# Patient Record
Sex: FEMALE | Race: AMERICAN INDIAN OR ALASKA NATIVE | NOT HISPANIC OR LATINO | Employment: FULL TIME | URBAN - METROPOLITAN AREA
[De-identification: names, ages, dates, MRNs, and addresses within clinical notes are randomized per-mention and may not be internally consistent; named-entity substitution may affect disease eponyms.]

---

## 2024-08-10 ENCOUNTER — APPOINTMENT (OUTPATIENT)
Dept: CT IMAGING | Facility: HOSPITAL | Age: 44
End: 2024-08-10
Payer: COMMERCIAL

## 2024-08-10 ENCOUNTER — HOSPITAL ENCOUNTER (EMERGENCY)
Facility: HOSPITAL | Age: 44
Discharge: LEFT BEFORE DISCHARGE | End: 2024-08-10
Attending: EMERGENCY MEDICINE
Payer: COMMERCIAL

## 2024-08-10 VITALS
HEIGHT: 72 IN | RESPIRATION RATE: 16 BRPM | BODY MASS INDEX: 33.86 KG/M2 | TEMPERATURE: 98.6 F | DIASTOLIC BLOOD PRESSURE: 87 MMHG | OXYGEN SATURATION: 100 % | HEART RATE: 81 BPM | SYSTOLIC BLOOD PRESSURE: 135 MMHG | WEIGHT: 250 LBS

## 2024-08-10 DIAGNOSIS — R93.0 ABNORMAL HEAD CT: Primary | ICD-10-CM

## 2024-08-10 DIAGNOSIS — R42 VERTIGO: ICD-10-CM

## 2024-08-10 DIAGNOSIS — R51.9 ACUTE NONINTRACTABLE HEADACHE, UNSPECIFIED HEADACHE TYPE: ICD-10-CM

## 2024-08-10 PROCEDURE — 25810000003 SODIUM CHLORIDE 0.9 % SOLUTION: Performed by: EMERGENCY MEDICINE

## 2024-08-10 PROCEDURE — 96374 THER/PROPH/DIAG INJ IV PUSH: CPT

## 2024-08-10 PROCEDURE — 96375 TX/PRO/DX INJ NEW DRUG ADDON: CPT

## 2024-08-10 PROCEDURE — 99284 EMERGENCY DEPT VISIT MOD MDM: CPT

## 2024-08-10 PROCEDURE — 25010000002 DIPHENHYDRAMINE PER 50 MG: Performed by: EMERGENCY MEDICINE

## 2024-08-10 PROCEDURE — 25010000002 PROCHLORPERAZINE 10 MG/2ML SOLUTION: Performed by: EMERGENCY MEDICINE

## 2024-08-10 PROCEDURE — 70450 CT HEAD/BRAIN W/O DYE: CPT

## 2024-08-10 RX ORDER — DIPHENHYDRAMINE HYDROCHLORIDE 50 MG/ML
25 INJECTION INTRAMUSCULAR; INTRAVENOUS ONCE
Status: COMPLETED | OUTPATIENT
Start: 2024-08-10 | End: 2024-08-10

## 2024-08-10 RX ORDER — MECLIZINE HYDROCHLORIDE 25 MG/1
25 TABLET ORAL ONCE
Status: COMPLETED | OUTPATIENT
Start: 2024-08-10 | End: 2024-08-10

## 2024-08-10 RX ORDER — SODIUM CHLORIDE 0.9 % (FLUSH) 0.9 %
10 SYRINGE (ML) INJECTION AS NEEDED
Status: DISCONTINUED | OUTPATIENT
Start: 2024-08-10 | End: 2024-08-10 | Stop reason: HOSPADM

## 2024-08-10 RX ORDER — PROCHLORPERAZINE EDISYLATE 5 MG/ML
10 INJECTION INTRAMUSCULAR; INTRAVENOUS ONCE
Status: COMPLETED | OUTPATIENT
Start: 2024-08-10 | End: 2024-08-10

## 2024-08-10 RX ADMIN — SODIUM CHLORIDE 1000 ML: 9 INJECTION, SOLUTION INTRAVENOUS at 14:19

## 2024-08-10 RX ADMIN — DIPHENHYDRAMINE HYDROCHLORIDE 25 MG: 50 INJECTION, SOLUTION INTRAMUSCULAR; INTRAVENOUS at 14:19

## 2024-08-10 RX ADMIN — PROCHLORPERAZINE EDISYLATE 10 MG: 5 INJECTION INTRAMUSCULAR; INTRAVENOUS at 14:19

## 2024-08-10 RX ADMIN — MECLIZINE HYDROCHLORIDE 25 MG: 25 TABLET ORAL at 14:19

## 2024-08-10 NOTE — ED PROVIDER NOTES
EMERGENCY DEPARTMENT ENCOUNTER  Room Number:  23/23  PCP: Provider, No Known  Independent Historians: Patient      HPI:  Chief Complaint: had concerns including Dizziness, Nausea, and Headache.     A complete HPI/ROS/PMH/PSH/SH/FH are unobtainable due to: None    Chronic or social conditions impacting patient care (Social Determinants of Health): None      Context: Keily Denis is a 44 y.o. female with a medical history of headaches, diabetes, sinus disease who presents to the ED c/o acute headache.  The patient reports around 11:00 in the morning today she developed an acute headache in the center of her forehead to go straight back to the back of her head.  She reports she was vomiting and she developed room spinning dizziness.  She reports she has some floaters in her vision which she has had before with similar headaches.  She reports that she had sinus surgery years ago and since then has had fewer headaches.  She does not have a neurologist.  She denies any weakness or numbness.  She denies any speech changes.  She reports a recent right hand injury and is in the right wrist splint.      Review of prior external notes (non-ED) -and- Review of prior external test results outside of this encounter:  Right hand x-ray 7/24/2024 shows no acute fracture or dislocation    Prescription drug monitoring program review:         PAST MEDICAL HISTORY  Active Ambulatory Problems     Diagnosis Date Noted    No Active Ambulatory Problems     Resolved Ambulatory Problems     Diagnosis Date Noted    No Resolved Ambulatory Problems     Past Medical History:   Diagnosis Date    Diabetes mellitus          PAST SURGICAL HISTORY  Past Surgical History:   Procedure Laterality Date    APPENDECTOMY      CHOLECYSTECTOMY      GASTRIC BYPASS           FAMILY HISTORY  History reviewed. No pertinent family history.      SOCIAL HISTORY  Social History     Socioeconomic History    Marital status: Single   Tobacco Use    Smoking status: Never     Smokeless tobacco: Never   Substance and Sexual Activity    Alcohol use: Not Currently    Sexual activity: Defer         ALLERGIES  Nsaids      REVIEW OF SYSTEMS  Review of Systems  Included in HPI  All systems reviewed and negative except for those discussed in HPI.      PHYSICAL EXAM    I have reviewed the triage vital signs and nursing notes.    ED Triage Vitals   Temp Heart Rate Resp BP SpO2   08/10/24 1340 08/10/24 1340 08/10/24 1340 08/10/24 1344 08/10/24 1340   98.6 °F (37 °C) 84 18 149/88 100 %      Temp src Heart Rate Source Patient Position BP Location FiO2 (%)   08/10/24 1340 08/10/24 1340 -- 08/10/24 1344 --   Tympanic Monitor  Right arm        Physical Exam  GENERAL: Awake, alert, no acute distress  SKIN: Warm, dry  HENT: Normocephalic, atraumatic, horizontal nystagmus on lateral gaze  EYES: no scleral icterus  CV: regular rhythm, regular rate  RESPIRATORY: normal effort, lungs clear  ABDOMEN: soft, nontender, nondistended  MUSCULOSKELETAL: no deformity.  Right hand in a splint.  NEURO: alert, moves all extremities, follows commands, cranial nerves II through XII intact.  Equal upper and lower extremity strength and sensation.              LAB RESULTS  No results found for this or any previous visit (from the past 24 hour(s)).      RADIOLOGY  CT Head Without Contrast    Result Date: 8/10/2024  EMERGENCY CT SCAN OF THE HEAD WITHOUT CONTRAST ON 08/10/2024  CLINICAL HISTORY: This is a 44-year-old female patient who has an acute headache and reports that the room is spinning and has dizziness.  TECHNIQUE: Spiral CT images were obtained from the base of the skull to the vertex without intravenous contrast. The images were reformatted and are submitted in 3 mm thick axial, sagittal and coronal CT sections with brain algorithm.  There are no prior head CTs or MRIs of the brain from [facility] for comparison.  FINDINGS: There is a 12 x 7 mm focal triangular area of low density over the  posterior lateral left cerebellum compatible with a small left PICA territory infarct, age-indeterminate. The remainder of the brain parenchyma is normal in attenuation. The ventricles are normal in size. I see no focal mass effect. There is no midline shift. No extra-axial fluid collections are identified. There is no evidence of acute intracranial hemorrhage. The calvarium and the skull base are normal in appearance. The paranasal sinuses and the mastoid air cells and middle ear cavities are clear. The orbits are normal in appearance.      There is an age-indeterminate 12 x 7 mm posterior lateral left cerebellar infarct in the left PICA territory. The remainder of the head CT is within normal limits. If one wishes to date the left PICA territory infarct or if there is any clinical suspicion of an acute stroke causing this patient's dizziness, an MRI of the brain is recommended for a more comprehensive assessment. The results and recommendations were communicated to Elijah Brown in the ER by telephone on 08/10/2024 at 3:15 p.m.  Radiation dose reduction techniques were utilized, including automated exposure control and exposure modulation based on body size.   This report was finalized on 8/10/2024 3:23 PM by Dr. Guy Sanchez M.D on Workstation: [id number]         MEDICATIONS GIVEN IN ER  Medications   sodium chloride 0.9 % bolus 1,000 mL (0 mL Intravenous Stopped 8/10/24 1605)   diphenhydrAMINE (BENADRYL) injection 25 mg (25 mg Intravenous Given 8/10/24 1419)   prochlorperazine (COMPAZINE) injection 10 mg (10 mg Intravenous Given 8/10/24 1419)   meclizine (ANTIVERT) tablet 25 mg (25 mg Oral Given 8/10/24 1419)         ORDERS PLACED DURING THIS VISIT:  Orders Placed This Encounter   Procedures    CT Head Without Contrast    Ambulatory Referral to Family Practice    Ambulatory Referral to Neurology         OUTPATIENT MEDICATION MANAGEMENT:  No current Epic-ordered facility-administered medications on file.      Current Outpatient Medications Ordered in Epic   Medication Sig Dispense Refill    Continuous Glucose Sensor (Dexcom G7 Sensor) misc For use with continuous glucose monitoring. Change every 10 days 3 each 1    fluconazole (DIFLUCAN) 150 MG tablet Take 1 tablet by mouth 1 time a day. May take the 2nd pill by mouth 2 days later if symptoms are still present. 2 tablet 0    fluconazole (DIFLUCAN) 150 MG tablet Take 1 tablet by mouth 1 time a day. May take the 2nd pill by mouth 2 days later if symptoms are still present. 2 tablet 0    Gvoke HypoPen 1-Pack 1 MG/0.2ML solution auto-injector Inject 1 pen (1 mg total) subcutaneously once as needed. 0.2 mL 1    Insulin Aspart (novoLOG) 100 UNIT/ML injection Inject 66 Units under the skin into the appropriate area as directed Daily. 20 mL 0    metroNIDAZOLE (FLAGYL) 500 MG tablet Take 1 tablet by mouth 2 times a day. Do not consume alcohol while taking this product. 14 tablet 0    metroNIDAZOLE (FLAGYL) 500 MG tablet Take 1 tablet by mouth 2 times a day. Do not consume alcohol while taking this product. 14 tablet 0    ondansetron ODT (ZOFRAN-ODT) 8 MG disintegrating tablet Dissolve one tablet by mouth every 8 hours or as needed for vomiting. 2 tablet 0    ondansetron ODT (ZOFRAN-ODT) 8 MG disintegrating tablet Dissolve one tablet by mouth every 8 hours or as needed for vomiting. 2 tablet 0         PROCEDURES  Procedures            PROGRESS, DATA ANALYSIS, CONSULTS, AND MEDICAL DECISION MAKING  All labs have been independently interpreted by me.  All radiology studies have been reviewed by me. All EKG's have been independently viewed and interpreted by me.  Discussion below represents my analysis of pertinent findings related to patient's condition, differential diagnosis, treatment plan and final disposition.    Differential diagnosis includes but is not limited to migraine headache, intracranial hemorrhage, vertigo, sinusitis,.    Clinical Scores:                   ED  Course as of 08/10/24 1828   Sat Aug 10, 2024   1356 Clinically the patient has a sinus headache which cause increased auricular pressure with vomiting and therefore caused vertigo. [TR]   1514 CT Head Without Contrast  My independent interpretation of the imaging study is no acute hemorrhage [TR]   1517 Discussing with Dr. Sanchez with neuroradiology.  CT of the head shows a left cerebellum age-indeterminate infarct [TR]   1537 I reviewed the workup and findings with the patient at the bedside.  She states she is feeling somewhat better after the Compazine, Benadryl, meclizine.  I notified her of her stroke seen on CT imaging.  I discussed with her my intention to admit her to the hospital for stroke workup.  I cannot entirely rule out a new stroke today given her room spinning dizziness.  She adamantly refuses admission to the hospital.  She states she has a dog with spina bifida that wears diapers and she has no one that can take care of him.  She states that she has no family or friends in town that can take care of the dog.  She states she will not be staying in the hospital.  I offered for her to return at any time to let us admit her for further stroke workup.  She is agreeable. [TR]      ED Course User Index  [TR] Renzo Brown MD             AS OF 18:28 EDT VITALS:    BP - 135/87  HR - 81  TEMP - 98.6 °F (37 °C) (Tympanic)  O2 SATS - 100%    COMPLEXITY OF CARE        DIAGNOSIS  Final diagnoses:   Abnormal head CT   Acute nonintractable headache, unspecified headache type   Vertigo         DISPOSITION  ED Disposition       ED Disposition   AMA    Condition   --    Comment   --                Please note that portions of this document were completed with a voice recognition program.    Note Disclaimer: At [facility], we believe that sharing information builds trust and better relationships. You are receiving this note because you recently visited [facility]. It is possible you will see [facility]  information before a provider has talked with you about it. This kind of information can be easy to misunderstand. To help you fully understand what it means for your health, we urge you to discuss this note with your provider.         Renzo Brown MD  08/10/24 0612

## 2024-08-10 NOTE — ED TRIAGE NOTES
Pt Ed from work (pt works here) due to dizzy, HA and nausea that started around 11:00 today. Pt stated dizziness is worse with with movement Pt stated hx of migraines and this episode is similar. Pt stated floaters over her eyes.

## 2024-08-10 NOTE — DISCHARGE INSTRUCTIONS
As soon as you are able, return immediately for further testing and admission.  We are always happy to take care of you.  You are at high risk of further strokes as we do not know what caused the stroke that was seen on CT today.  Follow-up with your primary care doctor for further evaluation.  I have placed referrals for primary care as well as neurology but this is not a suitable alternative to returning to the hospital for admission and further testing.

## 2024-08-14 ENCOUNTER — OFFICE VISIT (OUTPATIENT)
Dept: NEUROLOGY | Facility: CLINIC | Age: 44
End: 2024-08-14
Payer: COMMERCIAL

## 2024-08-14 VITALS
SYSTOLIC BLOOD PRESSURE: 112 MMHG | HEART RATE: 90 BPM | WEIGHT: 289 LBS | DIASTOLIC BLOOD PRESSURE: 76 MMHG | BODY MASS INDEX: 39.2 KG/M2 | OXYGEN SATURATION: 99 %

## 2024-08-14 DIAGNOSIS — G43.E09 CHRONIC MIGRAINE WITH AURA WITHOUT STATUS MIGRAINOSUS, NOT INTRACTABLE: ICD-10-CM

## 2024-08-14 DIAGNOSIS — R90.89 ABNORMAL CT OF BRAIN: Primary | ICD-10-CM

## 2024-08-14 DIAGNOSIS — R42 VERTIGO: ICD-10-CM
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
## 2024-08-15 ENCOUNTER — PATIENT ROUNDING (BHMG ONLY) (OUTPATIENT)
Dept: NEUROLOGY | Facility: CLINIC | Age: 44
End: 2024-08-15
Payer: COMMERCIAL

## 2024-08-19 ENCOUNTER — OFFICE VISIT (OUTPATIENT)
Dept: FAMILY MEDICINE CLINIC | Facility: CLINIC | Age: 44
End: 2024-08-19
Payer: COMMERCIAL

## 2024-08-19 VITALS
OXYGEN SATURATION: 98 % | BODY MASS INDEX: 37.63 KG/M2 | DIASTOLIC BLOOD PRESSURE: 78 MMHG | SYSTOLIC BLOOD PRESSURE: 132 MMHG | WEIGHT: 277.8 LBS | HEART RATE: 92 BPM | HEIGHT: 72 IN

## 2024-08-19 DIAGNOSIS — Z13.228 ENCOUNTER FOR SCREENING FOR OTHER METABOLIC DISORDERS: ICD-10-CM

## 2024-08-19 DIAGNOSIS — Z13.220 SCREENING FOR HYPERLIPIDEMIA: ICD-10-CM

## 2024-08-19 DIAGNOSIS — E55.9 VITAMIN D DEFICIENCY: ICD-10-CM

## 2024-08-19 DIAGNOSIS — Z12.31 ENCOUNTER FOR SCREENING MAMMOGRAM FOR MALIGNANT NEOPLASM OF BREAST: ICD-10-CM

## 2024-08-19 DIAGNOSIS — E03.9 ACQUIRED HYPOTHYROIDISM: ICD-10-CM

## 2024-08-19 DIAGNOSIS — E10.9 TYPE 1 DIABETES MELLITUS WITHOUT COMPLICATION: Primary | ICD-10-CM

## 2024-08-19 DIAGNOSIS — Z11.59 NEED FOR HEPATITIS C SCREENING TEST: ICD-10-CM

## 2024-08-19 DIAGNOSIS — G43.919 INTRACTABLE MIGRAINE WITHOUT STATUS MIGRAINOSUS, UNSPECIFIED MIGRAINE TYPE: ICD-10-CM

## 2024-08-19 DIAGNOSIS — Z00.00 ANNUAL PHYSICAL EXAM: ICD-10-CM

## 2024-08-19 PROCEDURE — 99386 PREV VISIT NEW AGE 40-64: CPT | Performed by: NURSE PRACTITIONER

## 2024-08-19 PROCEDURE — 99213 OFFICE O/P EST LOW 20 MIN: CPT | Performed by: NURSE PRACTITIONER

## 2024-08-19 RX ORDER — INSULIN ASPART 100 [IU]/ML
66 INJECTION, SOLUTION INTRAVENOUS; SUBCUTANEOUS DAILY
Qty: 20 ML | Refills: 0 | Status: SHIPPED | OUTPATIENT
Start: 2024-08-19

## 2024-08-19 RX ORDER — ACYCLOVIR 400 MG/1
TABLET ORAL
Qty: 3 EACH | Refills: 1 | Status: SHIPPED | OUTPATIENT
Start: 2024-08-19

## 2024-08-19 RX ORDER — GLUCAGON INJECTION, SOLUTION 1 MG/.2ML
INJECTION, SOLUTION SUBCUTANEOUS
Qty: 0.2 ML | Refills: 1 | Status: SHIPPED | OUTPATIENT
Start: 2024-08-19

## 2024-08-19 RX ORDER — LEVOTHYROXINE SODIUM 0.2 MG/1
175 TABLET ORAL DAILY
COMMUNITY
End: 2024-08-20 | Stop reason: DRUGHIGH

## 2024-08-19 NOTE — PROGRESS NOTES
Preventive Exam    History of Present Illness: Keily Denis is a 44 y.o. here for check up and review of routine health maintenance. she states she is doing well and has no concerns.    Patient is new to me and new to our office. She is type 1 diabetes.since she was a teen, she has insulin pump and was being managed by an endocrinologist in [place].     PMHX: hypothyroidism, migraines, type 1 diabetes, ADHD, anxiety.     Recent ER visit for dizziness and migraine. She had abnormal CT. Is being followed by neurology.     Past medical history, surgical history and family history have been reviewed.       Review of Systems   Constitutional:  Negative for appetite change, chills, fatigue, fever, unexpected weight gain and unexpected weight loss.   HENT:  Positive for congestion and rhinorrhea. Negative for dental problem, hearing loss, sinus pressure and sore throat.         Dental exam is due.   Eyes: Negative.  Negative for blurred vision, double vision, photophobia and visual disturbance.        Wears glasses. Eye exam is up to date.    Respiratory:  Negative for cough, chest tightness, shortness of breath and wheezing.    Cardiovascular:  Positive for palpitations. Negative for chest pain and leg swelling.   Gastrointestinal:  Positive for diarrhea (due to bariatric surgery). Negative for abdominal pain, constipation, nausea, vomiting and GERD.   Endocrine: Negative.  Negative for cold intolerance, heat intolerance, polydipsia, polyphagia and polyuria.   Genitourinary:  Positive for menstrual problem (heavy). Negative for breast discharge, breast lump, breast pain, flank pain, frequency, hematuria, pelvic pain, pelvic pressure, vaginal bleeding and vaginal discharge.   Musculoskeletal:  Negative for arthralgias, back pain, joint swelling and myalgias.   Skin: Negative.  Negative for color change and pallor.   Allergic/Immunologic: Positive for environmental allergies (seasonal allergies). Negative for food allergies.    Neurological:  Positive for headache. Negative for dizziness, weakness and numbness.   Hematological: Negative.  Does not bruise/bleed easily.   Psychiatric/Behavioral:  Negative for behavioral problems, sleep disturbance, suicidal ideas and depressed mood. The patient is nervous/anxious.        PHYSICAL EXAM    Vitals:    08/19/24 1325   BP: 132/78   Pulse: 92   SpO2: 98%       Body mass index is 37.68 kg/m².         Physical Exam  Vitals and nursing note reviewed.   Constitutional:       Appearance: Normal appearance. She is well-developed. She is obese.   HENT:      Head: Normocephalic and atraumatic.      Right Ear: Tympanic membrane, ear canal and external ear normal.      Left Ear: Tympanic membrane, ear canal and external ear normal.      Nose: Nose normal.      Mouth/Throat:      Lips: Pink.      Mouth: Mucous membranes are moist.      Tongue: No lesions.      Palate: No mass and lesions.      Pharynx: Oropharynx is clear. Uvula midline.      Tonsils: No tonsillar exudate.   Eyes:      Conjunctiva/sclera: Conjunctivae normal.      Pupils: Pupils are equal, round, and reactive to light.   Neck:      Thyroid: No thyromegaly.   Cardiovascular:      Rate and Rhythm: Normal rate and regular rhythm.      Pulses: Normal pulses.           Dorsalis pedis pulses are 2+ on the right side and 2+ on the left side.        Posterior tibial pulses are 2+ on the right side and 2+ on the left side.      Heart sounds: Normal heart sounds. No murmur heard.  Pulmonary:      Effort: Pulmonary effort is normal.      Breath sounds: Normal breath sounds.   Abdominal:      General: Bowel sounds are normal. There is no distension.      Palpations: Abdomen is soft.      Tenderness: There is no abdominal tenderness.   Musculoskeletal:         General: No deformity. Normal range of motion.      Cervical back: Normal range of motion and neck supple.      Right lower leg: No edema.      Left lower leg: No edema.   Lymphadenopathy:       Head:      Right side of head: No submental, submandibular, tonsillar, preauricular, posterior auricular or occipital adenopathy.      Left side of head: No submental, submandibular, tonsillar, preauricular, posterior auricular or occipital adenopathy.      Cervical: No cervical adenopathy.      Right cervical: No superficial, deep or posterior cervical adenopathy.     Left cervical: No superficial, deep or posterior cervical adenopathy.      Upper Body:      Right upper body: No supraclavicular adenopathy.      Left upper body: No supraclavicular adenopathy.   Skin:     General: Skin is warm and dry.      Capillary Refill: Capillary refill takes 2 to 3 seconds.   Neurological:      General: No focal deficit present.      Mental Status: She is alert and oriented to person, place, and time.      Cranial Nerves: No cranial nerve deficit.      Sensory: Sensation is intact.      Motor: Motor function is intact.      Coordination: Coordination is intact.      Gait: Gait is intact.   Psychiatric:         Attention and Perception: Attention and perception normal.         Mood and Affect: Mood and affect normal.         Speech: Speech normal.         Behavior: Behavior normal. Behavior is cooperative.         Thought Content: Thought content normal.         Cognition and Memory: Cognition and memory normal.         Judgment: Judgment normal.         Procedures    Diagnoses and all orders for this visit:    1. Type 1 diabetes mellitus without complication (Primary)  -     Ambulatory Referral to Endocrinology  -     Comprehensive Metabolic Panel  -     Microalbumin / Creatinine Urine Ratio - Urine, Clean Catch  -     Hemoglobin A1c    2. Annual physical exam    3. Acquired hypothyroidism  -     Ambulatory Referral to Endocrinology  -     TSH  -     T4, Free    4. Encounter for screening for other metabolic disorders  -     CBC & Differential    5. Screening for hyperlipidemia  -     Lipid Panel With / Chol / HDL Ratio    6.  Need for hepatitis C screening test  -     Hepatitis C Antibody    7. Intractable migraine without status migrainosus, unspecified migraine type    8. Encounter for screening mammogram for malignant neoplasm of breast  -     Mammo Screening Digital Tomosynthesis Bilateral With CAD; Future    9. Vitamin D deficiency  -     Vitamin D,25-Hydroxy    Other orders  -     Continuous Glucose Sensor (Dexcom G7 Sensor) misc; For use with continuous glucose monitoring. Change every 10 days  Dispense: 3 each; Refill: 1  -     Gvoke HypoPen 1-Pack 1 MG/0.2ML solution auto-injector; Inject 1 pen (1 mg total) subcutaneously once as needed.  Dispense: 0.2 mL; Refill: 1  -     Insulin Aspart (novoLOG) 100 UNIT/ML injection; Inject 66 Units under the skin into the appropriate area as directed Daily.  Dispense: 20 mL; Refill: 0  -     ubrogepant (Ubrelvy) 100 MG tablet; Take 1 tablet by mouth 1 (One) Time As Needed (migraine). Patient has been off for 3 months-cannot remember dosage  Dispense: 16 tablet; Refill: 3        Problems Addressed this Visit    None  Visit Diagnoses       Type 1 diabetes mellitus without complication    -  Primary    Relevant Medications    Gvoke HypoPen 1-Pack 1 MG/0.2ML solution auto-injector    Insulin Aspart (novoLOG) 100 UNIT/ML injection    Other Relevant Orders    Ambulatory Referral to Endocrinology    Comprehensive Metabolic Panel    Microalbumin / Creatinine Urine Ratio - Urine, Clean Catch    Hemoglobin A1c    Annual physical exam        Acquired hypothyroidism        Relevant Medications    levothyroxine (SYNTHROID, LEVOTHROID) 200 MCG tablet    Other Relevant Orders    Ambulatory Referral to Endocrinology    TSH    T4, Free    Encounter for screening for other metabolic disorders        Relevant Orders    CBC & Differential    Screening for hyperlipidemia        Relevant Orders    Lipid Panel With / Chol / HDL Ratio    Need for hepatitis C screening test        Relevant Orders    Hepatitis C  Antibody    Intractable migraine without status migrainosus, unspecified migraine type        Relevant Medications    ubrogepant (Ubrelvy) 100 MG tablet    Encounter for screening mammogram for malignant neoplasm of breast        Relevant Orders    Mammo Screening Digital Tomosynthesis Bilateral With CAD    Vitamin D deficiency        Relevant Orders    Vitamin D,25-Hydroxy          Diagnoses         Codes Comments    Type 1 diabetes mellitus without complication    -  Primary ICD-10-CM: E10.9  ICD-9-CM: 250.01     Annual physical exam     ICD-10-CM: Z00.00  ICD-9-CM: V70.0     Acquired hypothyroidism     ICD-10-CM: E03.9  ICD-9-CM: 244.9     Encounter for screening for other metabolic disorders     ICD-10-CM: Z13.228  ICD-9-CM: V77.99     Screening for hyperlipidemia     ICD-10-CM: Z13.220  ICD-9-CM: V77.91     Need for hepatitis C screening test     ICD-10-CM: Z11.59  ICD-9-CM: V73.89     Intractable migraine without status migrainosus, unspecified migraine type     ICD-10-CM: G43.919  ICD-9-CM: 346.91     Encounter for screening mammogram for malignant neoplasm of breast     ICD-10-CM: Z12.31  ICD-9-CM: V76.12     Vitamin D deficiency     ICD-10-CM: E55.9  ICD-9-CM: 268.9           Refill of insulin Novolog  Refill of auto injector  Refill of Dexcom G7  Refill of Ubrelvy  Will fill Levothyroxine once TSH level is received.  CBC  Cmp  A1C  Lipid panel  Microalbumin  Hepatitis C screening  TSH  T4  Referral to endocrinology  Mammogram  Routine health maintenance reviewed and discussed with Keily Denis.    Preventative counseling regarding healthy diet and exercise.   Pt reports that he wears a seatbelt regularly.     Return in about 1 year (around 8/19/2025) for Annual, Labs.

## 2024-08-20 DIAGNOSIS — E03.9 ACQUIRED HYPOTHYROIDISM: ICD-10-CM

## 2024-08-20 DIAGNOSIS — E55.9 VITAMIN D DEFICIENCY: Primary | ICD-10-CM

## 2024-08-20 LAB
25(OH)D3+25(OH)D2 SERPL-MCNC: 25.4 NG/ML (ref 30–100)
ALBUMIN SERPL-MCNC: 3.9 G/DL (ref 3.9–4.9)
ALBUMIN/CREAT UR: 22 MG/G CREAT (ref 0–29)
ALP SERPL-CCNC: 99 IU/L (ref 44–121)
ALT SERPL-CCNC: 13 IU/L (ref 0–32)
AST SERPL-CCNC: 19 IU/L (ref 0–40)
BASOPHILS # BLD AUTO: 0.1 X10E3/UL (ref 0–0.2)
BASOPHILS NFR BLD AUTO: 1 %
BILIRUB SERPL-MCNC: 0.3 MG/DL (ref 0–1.2)
BUN SERPL-MCNC: 15 MG/DL (ref 6–24)
BUN/CREAT SERPL: 16 (ref 9–23)
CALCIUM SERPL-MCNC: 8.5 MG/DL (ref 8.7–10.2)
CHLORIDE SERPL-SCNC: 105 MMOL/L (ref 96–106)
CHOLEST SERPL-MCNC: 195 MG/DL (ref 100–199)
CHOLEST/HDLC SERPL: 3.1 RATIO (ref 0–4.4)
CO2 SERPL-SCNC: 21 MMOL/L (ref 20–29)
CREAT SERPL-MCNC: 0.91 MG/DL (ref 0.57–1)
CREAT UR-MCNC: 181.4 MG/DL
EGFRCR SERPLBLD CKD-EPI 2021: 80 ML/MIN/1.73
EOSINOPHIL # BLD AUTO: 0.2 X10E3/UL (ref 0–0.4)
EOSINOPHIL NFR BLD AUTO: 3 %
ERYTHROCYTE [DISTWIDTH] IN BLOOD BY AUTOMATED COUNT: 13.9 % (ref 11.7–15.4)
GLOBULIN SER CALC-MCNC: 2.3 G/DL (ref 1.5–4.5)
GLUCOSE SERPL-MCNC: 205 MG/DL (ref 70–99)
HBA1C MFR BLD: 7.3 % (ref 4.8–5.6)
HCT VFR BLD AUTO: 34.8 % (ref 34–46.6)
HCV IGG SERPL QL IA: NON REACTIVE
HDLC SERPL-MCNC: 63 MG/DL
HGB BLD-MCNC: 10.8 G/DL (ref 11.1–15.9)
IMM GRANULOCYTES # BLD AUTO: 0 X10E3/UL (ref 0–0.1)
IMM GRANULOCYTES NFR BLD AUTO: 0 %
LDLC SERPL CALC-MCNC: 110 MG/DL (ref 0–99)
LYMPHOCYTES # BLD AUTO: 2.1 X10E3/UL (ref 0.7–3.1)
LYMPHOCYTES NFR BLD AUTO: 30 %
MCH RBC QN AUTO: 25.4 PG (ref 26.6–33)
MCHC RBC AUTO-ENTMCNC: 31 G/DL (ref 31.5–35.7)
MCV RBC AUTO: 82 FL (ref 79–97)
MICROALBUMIN UR-MCNC: 40.8 UG/ML
MONOCYTES # BLD AUTO: 0.6 X10E3/UL (ref 0.1–0.9)
MONOCYTES NFR BLD AUTO: 8 %
NEUTROPHILS # BLD AUTO: 4.2 X10E3/UL (ref 1.4–7)
NEUTROPHILS NFR BLD AUTO: 58 %
PLATELET # BLD AUTO: 271 X10E3/UL (ref 150–450)
POTASSIUM SERPL-SCNC: 4 MMOL/L (ref 3.5–5.2)
PROT SERPL-MCNC: 6.2 G/DL (ref 6–8.5)
RBC # BLD AUTO: 4.26 X10E6/UL (ref 3.77–5.28)
SODIUM SERPL-SCNC: 138 MMOL/L (ref 134–144)
T4 FREE SERPL-MCNC: 0.69 NG/DL (ref 0.82–1.77)
TRIGL SERPL-MCNC: 125 MG/DL (ref 0–149)
TSH SERPL DL<=0.005 MIU/L-ACNC: 6.61 UIU/ML (ref 0.45–4.5)
VLDLC SERPL CALC-MCNC: 22 MG/DL (ref 5–40)
WBC # BLD AUTO: 7.2 X10E3/UL (ref 3.4–10.8)

## 2024-08-20 RX ORDER — LEVOTHYROXINE SODIUM 0.2 MG/1
200 TABLET ORAL DAILY
Qty: 30 TABLET | Refills: 3 | Status: SHIPPED | OUTPATIENT
Start: 2024-08-20

## 2024-08-20 RX ORDER — ERGOCALCIFEROL 1.25 MG/1
50000 CAPSULE ORAL WEEKLY
Qty: 5 CAPSULE | Refills: 2 | Status: SHIPPED | OUTPATIENT
Start: 2024-08-20

## 2024-08-23 ENCOUNTER — TELEPHONE (OUTPATIENT)
Dept: FAMILY MEDICINE CLINIC | Facility: CLINIC | Age: 44
End: 2024-08-23
Payer: COMMERCIAL

## 2024-08-23 ENCOUNTER — PATIENT ROUNDING (BHMG ONLY) (OUTPATIENT)
Dept: FAMILY MEDICINE CLINIC | Facility: CLINIC | Age: 44
End: 2024-08-23
Payer: COMMERCIAL

## 2024-08-23 NOTE — TELEPHONE ENCOUNTER
Called patient's pharmacy for insurance info to do PA for Ubrelvy 100mg. Lvm 2x. Also sent patient mychart message

## 2024-08-23 NOTE — PROGRESS NOTES
A [employer] message has been sent to the patient for PATIENT ROUNDING with [facility].

## 2024-08-29 ENCOUNTER — SPECIALTY PHARMACY (OUTPATIENT)
Dept: ENDOCRINOLOGY | Age: 44
End: 2024-08-29
Payer: COMMERCIAL

## 2024-08-29 ENCOUNTER — OFFICE VISIT (OUTPATIENT)
Dept: ENDOCRINOLOGY | Age: 44
End: 2024-08-29
Payer: COMMERCIAL

## 2024-08-29 VITALS
BODY MASS INDEX: 37 KG/M2 | HEART RATE: 91 BPM | OXYGEN SATURATION: 99 % | DIASTOLIC BLOOD PRESSURE: 80 MMHG | SYSTOLIC BLOOD PRESSURE: 120 MMHG | WEIGHT: 272.8 LBS | TEMPERATURE: 96.6 F

## 2024-08-29 DIAGNOSIS — E10.9 TYPE 1 DIABETES MELLITUS WITHOUT COMPLICATION: Primary | ICD-10-CM

## 2024-08-29 DIAGNOSIS — E03.9 ACQUIRED HYPOTHYROIDISM: ICD-10-CM

## 2024-08-29 RX ORDER — INSULIN ASPART 100 [IU]/ML
70 INJECTION, SOLUTION INTRAVENOUS; SUBCUTANEOUS DAILY
Qty: 20 ML | Refills: 2 | Status: SHIPPED | OUTPATIENT
Start: 2024-08-29

## 2024-08-29 RX ORDER — ACYCLOVIR 400 MG/1
TABLET ORAL
Qty: 3 EACH | Refills: 2 | Status: SHIPPED | OUTPATIENT
Start: 2024-08-29

## 2024-08-29 RX ORDER — SIMVASTATIN 10 MG
10 TABLET ORAL EVERY EVENING
Qty: 30 TABLET | Refills: 11 | Status: SHIPPED | OUTPATIENT
Start: 2024-08-29 | End: 2025-08-29

## 2024-08-29 NOTE — PROGRESS NOTES
Benefits Investigation Summary    Prescription: Renewal     Dispensing pharmacy: [facility]    Copay amount: Novolog $10/20 mL    Prior Auth and Med Assistance notes: N/A    BIN: [id number]   PCN: [id number]  RX GROUP: [id number]    Patient reported being almost out of pump supplies. Patient was provided [employer] contact number ([phone] option 3) and [employer] Evangelista duvall will be reaching out to patient.     Lorie Reyes, [place], MM   Clinical Speciality Pharmacist, Endocrinology   8/29/2024  16:11 EDT

## 2024-08-29 NOTE — PROGRESS NOTES
Chief complaint   Chief Complaint   Patient presents with    Diabetes    Hypothyroidism          Subjective     History of Present Illness:      This is a 44 year old female I am seeing for type 1 DM   referred by PCP     other medical history is   1- Hypothyroidism   2- Obesity with h/o bariatric surgery   3- Low Vit D   Pt had T1DM for many years   Current home medication for diabetes   T slim  X 2pump     the A1c was  7.3 in 8-2024  Checks blood sugar at home using CGM DEXCOM G 7   Checks blood sugar 4 times per day   SBMG:  pt states that she is here to establish care for her diabetes and thyroid and moved from [place] and saw a new PC in 8-2024 and TSH was high and dose was adjusted from 175 mcg 6/7 days to 200 mcg daily and is about to run out of pump supply , there is No Hypoglycemic episodes  No AM Headaches /Nightmares  No Polyuria / Polydipsia  No Blurring of Vision  Last Dilated Pupil Exam, in 2023 , no DM in the eye   NoTingling / numbness in feet  No Dizziness / lightheadedness  Dietary Compliance, tries   Exercise , walking at work   Weight is going down and up   No Falls, No Nausea, vomiting / Diarrhea  Works for [employer] now    Latest Reference Range & Units 08/19/24 14:26   Sodium 134 - 144 mmol/L 138   Potassium 3.5 - 5.2 mmol/L 4.0   Chloride 96 - 106 mmol/L 105   CO2 20 - 29 mmol/L 21   BUN 6 - 24 mg/dL 15   Creatinine 0.57 - 1.00 mg/dL 0.91   BUN/Creatinine Ratio 9 - 23  16   EGFR Result >59 mL/min/1.73 80   Glucose 70 - 99 mg/dL 205 (H)   Calcium 8.7 - 10.2 mg/dL 8.5 (L)   Alkaline Phosphatase 44 - 121 IU/L 99   Total Protein 6.0 - 8.5 g/dL 6.2   Albumin 3.9 - 4.9 g/dL 3.9   AST (SGOT) 0 - 40 IU/L 19   ALT (SGPT) 0 - 32 IU/L 13   Total Bilirubin 0.0 - 1.2 mg/dL 0.3   Hemoglobin A1C 4.8 - 5.6 % 7.3 (H)   TSH Baseline 0.450 - 4.500 uIU/mL 6.610 (H)   Free T4 0.82 - 1.77 ng/dL 0.69 (L)   Total Cholesterol 100 - 199 mg/dL 195   HDL Cholesterol >39 mg/dL 63   LDL Cholesterol  0 - 99 mg/dL 110 (H)    Triglycerides 0 - 149 mg/dL 125   Chol/HDL Ratio 0.0 - 4.4 ratio 3.1   VLDL Cholesterol Raymond 5 - 40 mg/dL 22   25 Hydroxy, Vitamin D 30.0 - 100.0 ng/mL 25.4 (L)   Globulin 1.5 - 4.5 g/dL 2.3   WBC 3.4 - 10.8 x10E3/uL 7.2   RBC 3.77 - 5.28 x10E6/uL 4.26   Hemoglobin 11.1 - 15.9 g/dL 10.8 (L)   Hematocrit 34.0 - 46.6 % 34.8   Platelets 150 - 450 x10E3/uL 271   RDW 11.7 - 15.4 % 13.9   MCV 79 - 97 fL 82   MCH 26.6 - 33.0 pg 25.4 (L)   MCHC 31.5 - 35.7 g/dL 31.0 (L)   Neutrophil Rel % Not Estab. % 58   Lymphocyte Rel % Not Estab. % 30   Monocyte Rel % Not Estab. % 8   Eosinophil Rel % Not Estab. % 3   Basophil Rel % Not Estab. % 1   Immature Granulocyte Rel % Not Estab. % 0   Neutrophils Absolute 1.4 - 7.0 x10E3/uL 4.2   Lymphocytes Absolute 0.7 - 3.1 x10E3/uL 2.1   Monocytes Absolute 0.1 - 0.9 x10E3/uL 0.6   Eosinophils Absolute 0.0 - 0.4 x10E3/uL 0.2   Basophils Absolute 0.0 - 0.2 x10E3/uL 0.1   Immature Grans, Absolute 0.0 - 0.1 x10E3/uL 0.0   Hep C Virus Ab Non Reactive  Non Reactive   Creatinine, Urine Not Estab. mg/dL 181.4   Microalbumin, Urine Not Estab. ug/mL 40.8   Microalbumin/Creatinine Ratio 0 - 29 mg/g creat 22   (H): Data is abnormally high  (L): Data is abnormally low  Family History   Adopted: Yes     Social History     Socioeconomic History    Marital status: Single   Tobacco Use    Smoking status: Former     Current packs/day: 0.00     Types: Cigarettes     Quit date:      Years since quittin.6     Passive exposure: Never    Smokeless tobacco: Never   Vaping Use    Vaping status: Never Used   Substance and Sexual Activity    Alcohol use: Never    Drug use: Never    Sexual activity: Yes     Partners: Male     Birth control/protection: None     Past Medical History:   Diagnosis Date    ADHD (attention deficit hyperactivity disorder)     Allergic     Anxiety     Diabetes mellitus     Headache     Hypothyroidism      Past Surgical History:   Procedure Laterality Date    APPENDECTOMY       BARIATRIC SURGERY      CHOLECYSTECTOMY      GASTRIC BYPASS      SINUS SURGERY         Current Outpatient Medications:     Continuous Glucose Sensor (Dexcom G7 Sensor) misc, Change every 10 days., Disp: 3 each, Rfl: 2    NovoLOG 100 UNIT/ML injection, Inject 70 Units under the skin into the appropriate area as directed Daily., Disp: 20 mL, Rfl: 2    Gvoke HypoPen 1-Pack 1 MG/0.2ML solution auto-injector, Inject 1 pen (1 mg total) subcutaneously once as needed., Disp: 0.2 mL, Rfl: 1    levothyroxine (Synthroid) 200 MCG tablet, Take 1 tablet by mouth Daily., Disp: 30 tablet, Rfl: 3    simvastatin (Zocor) 10 MG tablet, Take 1 tablet by mouth Every Evening., Disp: 30 tablet, Rfl: 11    ubrogepant (Ubrelvy) 100 MG tablet, Take 1 tablet by mouth 1 (One) Time As Needed (migraine)., Disp: 10 tablet, Rfl: 3    vitamin D (ERGOCALCIFEROL) 1.25 MG (51605 UT) capsule capsule, Take 1 capsule by mouth 1 (One) Time Per Week., Disp: 5 capsule, Rfl: 2  Beta adrenergic blockers and Nsaids    Objective   Vitals:    08/29/24 0858   BP: 120/80   Pulse: 91   Temp: 96.6 °F (35.9 °C)   SpO2: 99%          Physical Exam  HENT:      Head: Normocephalic.   Neurological:      General: No focal deficit present.      Mental Status: She is alert and oriented to person, place, and time.               Diagnoses and all orders for this visit:    1. Type 1 diabetes mellitus without complication (Primary)  -     simvastatin (Zocor) 10 MG tablet; Take 1 tablet by mouth Every Evening.  Dispense: 30 tablet; Refill: 11  -     NovoLOG 100 UNIT/ML injection; Inject 70 Units under the skin into the appropriate area as directed Daily.  Dispense: 20 mL; Refill: 2  -     Continuous Glucose Sensor (Dexcom G7 Sensor) misc; Change every 10 days.  Dispense: 3 each; Refill: 2    2. Acquired hypothyroidism         Assessment:     1- DM, Type 1  Fair control with High risk   labs on file , GFR 80 8-2024 , no proteinuria   We discussed the medications  Hypoglycemia  and its importance was reviewed   Labs where shown to the patient   2. BP is in good range   3. Hyperlipidemia, LDL was high 110   4. Obesity, We discussed the importance of weight loss and the impact of losing 7 % Of the current body weight   5. Low Vit D on replacement       Plan:    1. Diet, exercise and weight management  2. Consistent food intake to avoid low blood sugars  3. Home medication includes for diabetes     Stat on T slim pump with the same setting for now   Will work on getting new supply   4. Consistent checking of blood sugars using DEXCOM G 7   5. I reviewed the last progress note  6. Annual eye exam  7. Return in 3 months   8. Needs to talk to PC about Tsh recheck in 6 weeks   9. Start Simvastatin 10 mg daily, Rx sent to the pharmacy, we talked about stopping it if she gets pregnant   10. Labs : A1c at the next visit       I discussed with the patient/legal representative the risks and the benefits associated with the medications.  The patient/legal representative has been given the opportunity to ask questions.  Alternatives to the proposed treatment (s) were discussed, including the likely results of no treatment.  The patient/legal representative wishes to proceed.       Kaiser Bragg MD   08/29/24  09:47 EDT

## 2024-09-13 ENCOUNTER — TELEPHONE (OUTPATIENT)
Dept: NEUROLOGY | Facility: CLINIC | Age: 44
End: 2024-09-13
Payer: COMMERCIAL

## 2024-09-13 NOTE — TELEPHONE ENCOUNTER
Spoke to pt in regards to r/s appt. Agreed to be r/s for Sept 25th and will call back if needing to be r/s from that appt date.

## 2024-09-20 RX ORDER — GLUCAGON INJECTION, SOLUTION 1 MG/.2ML
INJECTION, SOLUTION SUBCUTANEOUS
Qty: 0.2 ML | Refills: 1 | Status: SHIPPED | OUTPATIENT
Start: 2024-09-20

## 2024-09-24 ENCOUNTER — TELEPHONE (OUTPATIENT)
Dept: NEUROLOGY | Facility: CLINIC | Age: 44
End: 2024-09-24

## 2024-09-24 DIAGNOSIS — R42 VERTIGO: ICD-10-CM

## 2024-09-24 DIAGNOSIS — R90.89 ABNORMAL CT OF BRAIN: Primary | ICD-10-CM

## 2024-09-24 NOTE — TELEPHONE ENCOUNTER
Caller: Keily Denis    Relationship: Self    Best call back number: [phone]    What is the best time to reach you: ANY    Who are you requesting to speak with (clinical staff, provider,  specific staff member): MONTSERRAT    What was the call regarding: ORDER FOR MRI OF NECK. REFERRAL NOTES STATE FACILITY REQUIRES ORDERS TO BE WITH & WITHOUT CONTRAST. THEY CAN'T SCHEDULE UNTIL ORDERS ARE UPDATED. PLEASE ADVISE, THANK YOU.    SENDING AS HIGH PRIORITY AS OTHER MRI'S HAVE BEEN SCHEDULED & PATIENT WOULD LIKE TO TRY AND GET THIS ONE ON THE SAME DAY.

## 2024-09-24 NOTE — TELEPHONE ENCOUNTER
Provider: DR SIEGEL    Caller: PATIENT    Relationship to Patient: SELF    Phone Number: [phone]    Reason for Call: STATES SHE TRIED TO SCHEDULE MRI'S  BUT THE ORDERS HAVE BEEN CANCELED OUT. PLEASE RE-DO ORDERS AND ADVISE WHEN PLACED SO PATIENT CAN CALL WHEN NOT WORKING. THANK YOU.
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
## 2024-10-03 ENCOUNTER — OFFICE VISIT (OUTPATIENT)
Dept: NEUROLOGY | Facility: CLINIC | Age: 44
End: 2024-10-03
Payer: COMMERCIAL

## 2024-10-03 VITALS
WEIGHT: 283.9 LBS | DIASTOLIC BLOOD PRESSURE: 70 MMHG | BODY MASS INDEX: 38.45 KG/M2 | HEIGHT: 72 IN | SYSTOLIC BLOOD PRESSURE: 112 MMHG | OXYGEN SATURATION: 99 % | HEART RATE: 78 BPM

## 2024-10-03 DIAGNOSIS — R90.89 ABNORMAL CT OF BRAIN: ICD-10-CM

## 2024-10-03 DIAGNOSIS — G43.109 MIGRAINE WITH AURA AND WITHOUT STATUS MIGRAINOSUS, NOT INTRACTABLE: Primary | ICD-10-CM

## 2024-10-03 PROCEDURE — 99214 OFFICE O/P EST MOD 30 MIN: CPT | Performed by: PSYCHIATRY & NEUROLOGY

## 2024-10-03 RX ORDER — METOCLOPRAMIDE 5 MG/1
5 TABLET ORAL 3 TIMES DAILY PRN
Qty: 30 TABLET | Refills: 1 | Status: SHIPPED | OUTPATIENT
Start: 2024-10-03
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
## 2024-10-04 ENCOUNTER — TELEPHONE (OUTPATIENT)
Dept: NEUROLOGY | Facility: CLINIC | Age: 44
End: 2024-10-04
Payer: COMMERCIAL

## 2024-10-04 NOTE — TELEPHONE ENCOUNTER
Keily Denis (Key: [id number])  PA Case ID #: [id number]  Need Help? Call us at [phone]  Status  sent iconSent to Plan today  Drug  Ubrelvy 100MG tablets  ePA cloud logo  Form  [employer] Electronic Prior Authorization Form (2017 [employer])

## 2024-11-08 ENCOUNTER — TREATMENT (OUTPATIENT)
Dept: ENDOCRINOLOGY | Age: 44
End: 2024-11-08
Payer: COMMERCIAL